# Patient Record
Sex: FEMALE | Race: WHITE | Employment: OTHER | URBAN - METROPOLITAN AREA
[De-identification: names, ages, dates, MRNs, and addresses within clinical notes are randomized per-mention and may not be internally consistent; named-entity substitution may affect disease eponyms.]

---

## 2017-04-24 PROCEDURE — 81001 URINALYSIS AUTO W/SCOPE: CPT | Performed by: INTERNAL MEDICINE

## 2017-04-28 PROBLEM — E89.0 POSTABLATIVE HYPOTHYROIDISM: Status: ACTIVE | Noted: 2017-04-28

## 2017-04-28 PROBLEM — E03.9 HYPOTHYROIDISM, UNSPECIFIED TYPE: Status: RESOLVED | Noted: 2017-04-28 | Resolved: 2017-04-28

## 2017-04-28 PROBLEM — E03.9 HYPOTHYROIDISM, UNSPECIFIED TYPE: Status: ACTIVE | Noted: 2017-04-28

## 2017-11-20 PROCEDURE — 83970 ASSAY OF PARATHORMONE: CPT | Performed by: INTERNAL MEDICINE

## 2018-01-09 PROBLEM — J42 CHRONIC BRONCHITIS, UNSPECIFIED CHRONIC BRONCHITIS TYPE (HCC): Status: ACTIVE | Noted: 2018-01-09

## 2018-11-30 PROBLEM — M81.0 SENILE OSTEOPOROSIS: Status: ACTIVE | Noted: 2018-11-30

## 2019-08-08 PROBLEM — M54.2 NECK PAIN: Status: ACTIVE | Noted: 2019-08-08

## 2019-12-06 PROBLEM — I48.0 PAROXYSMAL ATRIAL FIBRILLATION (HCC): Status: ACTIVE | Noted: 2019-12-06

## 2020-06-02 PROBLEM — I70.0 AORTIC ATHEROSCLEROSIS (HCC): Status: ACTIVE | Noted: 2020-06-02

## 2020-06-02 PROBLEM — J43.2 CENTRILOBULAR EMPHYSEMA (HCC): Status: ACTIVE | Noted: 2020-06-02

## 2020-06-02 PROBLEM — J43.9 PULMONARY EMPHYSEMA (HCC): Status: ACTIVE | Noted: 2020-06-02

## 2020-06-02 PROBLEM — J47.9 BRONCHIECTASIS (HCC): Status: ACTIVE | Noted: 2020-06-02

## 2021-07-17 PROBLEM — S32.301D: Status: ACTIVE | Noted: 2021-07-17

## 2021-08-09 PROBLEM — D68.69 SECONDARY HYPERCOAGULABLE STATE (HCC): Status: ACTIVE | Noted: 2021-08-09

## 2021-08-09 PROBLEM — I28.8 DILATATION OF PULMONIC ARTERY (HCC): Status: ACTIVE | Noted: 2021-08-09

## 2021-12-16 PROBLEM — M54.50 CHRONIC RIGHT-SIDED LOW BACK PAIN WITHOUT SCIATICA: Status: ACTIVE | Noted: 2021-12-16

## 2021-12-16 PROBLEM — G89.29 CHRONIC RIGHT-SIDED LOW BACK PAIN WITHOUT SCIATICA: Status: ACTIVE | Noted: 2021-12-16

## 2021-12-16 PROBLEM — R01.1 SYSTOLIC MURMUR: Status: ACTIVE | Noted: 2021-12-16

## 2021-12-23 RX ORDER — ACETAMINOPHEN 500 MG
1000 TABLET ORAL ONCE
Status: CANCELLED | OUTPATIENT
Start: 2021-12-23 | End: 2021-12-23

## 2021-12-23 RX ORDER — MELATONIN 10 MG
CAPSULE ORAL NIGHTLY
COMMUNITY

## 2022-01-03 ENCOUNTER — LAB ENCOUNTER (OUTPATIENT)
Dept: LAB | Facility: HOSPITAL | Age: 82
End: 2022-01-03
Attending: SURGERY
Payer: MEDICARE

## 2022-01-03 DIAGNOSIS — E21.3 HYPERPARATHYROIDISM (HCC): ICD-10-CM

## 2022-01-03 LAB — SARS-COV-2 RNA RESP QL NAA+PROBE: NOT DETECTED

## 2022-01-04 ENCOUNTER — HOSPITAL ENCOUNTER (OUTPATIENT)
Facility: HOSPITAL | Age: 82
Setting detail: HOSPITAL OUTPATIENT SURGERY
Discharge: HOME | End: 2022-01-04
Attending: SURGERY | Admitting: SURGERY
Payer: MEDICARE

## 2022-01-04 ENCOUNTER — ANESTHESIA EVENT (OUTPATIENT)
Dept: SURGERY | Facility: HOSPITAL | Age: 82
End: 2022-01-04
Payer: MEDICARE

## 2022-01-04 ENCOUNTER — ANESTHESIA (OUTPATIENT)
Dept: SURGERY | Facility: HOSPITAL | Age: 82
End: 2022-01-04
Payer: MEDICARE

## 2022-01-04 VITALS
HEART RATE: 89 BPM | HEIGHT: 66 IN | RESPIRATION RATE: 20 BRPM | TEMPERATURE: 99 F | WEIGHT: 165.81 LBS | DIASTOLIC BLOOD PRESSURE: 68 MMHG | BODY MASS INDEX: 26.65 KG/M2 | SYSTOLIC BLOOD PRESSURE: 135 MMHG | OXYGEN SATURATION: 96 %

## 2022-01-04 DIAGNOSIS — E21.3 HYPERPARATHYROIDISM (HCC): Primary | ICD-10-CM

## 2022-01-04 LAB
PTH-INTACT SERPL-MCNC: 102.2 PG/ML
PTH-INTACT SERPL-MCNC: 108.4 PG/ML
PTH-INTACT SERPL-MCNC: 135.9 PG/ML
PTH-INTACT SERPL-MCNC: 70.9 PG/ML
PTH-INTACT SERPL-MCNC: 73.7 PG/ML
PTH-INTACT SERPL-MCNC: 90 PG/ML
PTH-INTACT SERPL-MCNC: <6.3 PG/ML (ref 18.5–88)

## 2022-01-04 PROCEDURE — 88305 TISSUE EXAM BY PATHOLOGIST: CPT | Performed by: SURGERY

## 2022-01-04 PROCEDURE — 83970 ASSAY OF PARATHORMONE: CPT | Performed by: SURGERY

## 2022-01-04 PROCEDURE — 0GBN0ZZ EXCISION OF RIGHT INFERIOR PARATHYROID GLAND, OPEN APPROACH: ICD-10-PCS | Performed by: SURGERY

## 2022-01-04 PROCEDURE — 0GBL0ZZ EXCISION OF RIGHT SUPERIOR PARATHYROID GLAND, OPEN APPROACH: ICD-10-PCS | Performed by: SURGERY

## 2022-01-04 PROCEDURE — 88331 PATH CONSLTJ SURG 1 BLK 1SPC: CPT | Performed by: SURGERY

## 2022-01-04 PROCEDURE — 0GBP0ZZ EXCISION OF LEFT INFERIOR PARATHYROID GLAND, OPEN APPROACH: ICD-10-PCS | Performed by: SURGERY

## 2022-01-04 RX ORDER — ACETAMINOPHEN 500 MG
1000 TABLET ORAL ONCE
COMMUNITY

## 2022-01-04 RX ORDER — HYDROMORPHONE HYDROCHLORIDE 1 MG/ML
INJECTION, SOLUTION INTRAMUSCULAR; INTRAVENOUS; SUBCUTANEOUS
Status: COMPLETED
Start: 2022-01-04 | End: 2022-01-04

## 2022-01-04 RX ORDER — LIDOCAINE HYDROCHLORIDE 10 MG/ML
INJECTION, SOLUTION EPIDURAL; INFILTRATION; INTRACAUDAL; PERINEURAL AS NEEDED
Status: DISCONTINUED | OUTPATIENT
Start: 2022-01-04 | End: 2022-01-04 | Stop reason: SURG

## 2022-01-04 RX ORDER — SODIUM CHLORIDE, SODIUM LACTATE, POTASSIUM CHLORIDE, CALCIUM CHLORIDE 600; 310; 30; 20 MG/100ML; MG/100ML; MG/100ML; MG/100ML
INJECTION, SOLUTION INTRAVENOUS CONTINUOUS
Status: DISCONTINUED | OUTPATIENT
Start: 2022-01-04 | End: 2022-01-04

## 2022-01-04 RX ORDER — ROCURONIUM BROMIDE 10 MG/ML
INJECTION, SOLUTION INTRAVENOUS AS NEEDED
Status: DISCONTINUED | OUTPATIENT
Start: 2022-01-04 | End: 2022-01-04 | Stop reason: SURG

## 2022-01-04 RX ORDER — HYDROCODONE BITARTRATE AND ACETAMINOPHEN 5; 325 MG/1; MG/1
1 TABLET ORAL EVERY 4 HOURS PRN
Qty: 10 TABLET | Refills: 0 | Status: SHIPPED | OUTPATIENT
Start: 2022-01-04

## 2022-01-04 RX ORDER — NALOXONE HYDROCHLORIDE 0.4 MG/ML
80 INJECTION, SOLUTION INTRAMUSCULAR; INTRAVENOUS; SUBCUTANEOUS AS NEEDED
Status: DISCONTINUED | OUTPATIENT
Start: 2022-01-04 | End: 2022-01-04

## 2022-01-04 RX ORDER — HYDROCODONE BITARTRATE AND ACETAMINOPHEN 5; 325 MG/1; MG/1
2 TABLET ORAL AS NEEDED
Status: COMPLETED | OUTPATIENT
Start: 2022-01-04 | End: 2022-01-04

## 2022-01-04 RX ORDER — HYDROCODONE BITARTRATE AND ACETAMINOPHEN 5; 325 MG/1; MG/1
1 TABLET ORAL AS NEEDED
Status: COMPLETED | OUTPATIENT
Start: 2022-01-04 | End: 2022-01-04

## 2022-01-04 RX ORDER — HYDROMORPHONE HYDROCHLORIDE 1 MG/ML
0.4 INJECTION, SOLUTION INTRAMUSCULAR; INTRAVENOUS; SUBCUTANEOUS EVERY 5 MIN PRN
Status: DISCONTINUED | OUTPATIENT
Start: 2022-01-04 | End: 2022-01-04

## 2022-01-04 RX ORDER — ONDANSETRON 2 MG/ML
INJECTION INTRAMUSCULAR; INTRAVENOUS AS NEEDED
Status: DISCONTINUED | OUTPATIENT
Start: 2022-01-04 | End: 2022-01-04 | Stop reason: SURG

## 2022-01-04 RX ORDER — BUPIVACAINE HYDROCHLORIDE 5 MG/ML
INJECTION, SOLUTION EPIDURAL; INTRACAUDAL AS NEEDED
Status: DISCONTINUED | OUTPATIENT
Start: 2022-01-04 | End: 2022-01-04 | Stop reason: HOSPADM

## 2022-01-04 RX ORDER — DEXAMETHASONE SODIUM PHOSPHATE 4 MG/ML
VIAL (ML) INJECTION AS NEEDED
Status: DISCONTINUED | OUTPATIENT
Start: 2022-01-04 | End: 2022-01-04 | Stop reason: SURG

## 2022-01-04 RX ADMIN — DEXAMETHASONE SODIUM PHOSPHATE 12 MG: 4 MG/ML VIAL (ML) INJECTION at 10:31:00

## 2022-01-04 RX ADMIN — SODIUM CHLORIDE, SODIUM LACTATE, POTASSIUM CHLORIDE, CALCIUM CHLORIDE: 600; 310; 30; 20 INJECTION, SOLUTION INTRAVENOUS at 13:18:00

## 2022-01-04 RX ADMIN — LIDOCAINE HYDROCHLORIDE 100 MG: 10 INJECTION, SOLUTION EPIDURAL; INFILTRATION; INTRACAUDAL; PERINEURAL at 10:26:00

## 2022-01-04 RX ADMIN — ONDANSETRON 4 MG: 2 INJECTION INTRAMUSCULAR; INTRAVENOUS at 10:31:00

## 2022-01-04 RX ADMIN — ROCURONIUM BROMIDE 10 MG: 10 INJECTION, SOLUTION INTRAVENOUS at 10:26:00

## 2022-01-04 NOTE — ANESTHESIA POSTPROCEDURE EVALUATION
[address] Patient Status:  Hospital Outpatient Surgery   Age/Gender 80year old female MRN [id number]   Location [address] [facility] Attending Giovani Lopez MD   Hosp Day # 0 PCP MD Luis Fernando Villavicencio

## 2022-01-04 NOTE — BRIEF OP NOTE
Pre-Operative Diagnosis: Hyperparathyroidism ([facility] [E21.3]     Post-Operative Diagnosis: Hyperparathyroidism ([facility] [E21.3]      Procedure Performed:   PARATHYROIDECTOMY WITH INTRAOPERATIVE ULTRASOUND, INTACT PARATHORMONE ASSAY, NERVE MONITORING    Surgeon(

## 2022-01-04 NOTE — ANESTHESIA PREPROCEDURE EVALUATION
PRE-OP EVALUATION    Patient Name: Sharma Crimes    Admit Diagnosis: Hyperparathyroidism ([facility] [E21.3]    Pre-op Diagnosis: Hyperparathyroidism ([facility] [E21.3]    PARATHYROIDECTOMY WITH INTRAOPERATIVE ULTRASOUND, INTACT PARATHORMONE ASSAY, NERVE MONITORING 2100  ELIQUIS 5 MG Oral Tab, Take 1 tablet (5 mg total) by mouth 2 (two) times a day., Disp: 180 tablet, Rfl: 3, 1/1/2022  Albuterol Sulfate  (90 Base) MCG/ACT Inhalation Aero Soln, Inhale 2 puffs into the lungs every 6 (six) hours as needed for [employer] 12/13/2021    K 4.52 12/13/2021     (H) 12/13/2021    CO2 29.0 12/13/2021    BUN 10.0 12/13/2021    CREATSERUM 0.52 12/13/2021    GLU 91 12/13/2021    CA 11.2 (H) 12/13/2021            Airway      Mallampati: I  Mouth opening: >3 FB  TM distance: 4 -

## 2022-01-05 NOTE — OPERATIVE REPORT
[facility]    PATIENT'S NAME: Danielle Rinne   ATTENDING PHYSICIAN: Vicky Sanchez M.D. OPERATING PHYSICIAN: Vicky Sanchez M.D.    PATIENT ACCOUNT#:   [de-identified]    LOCATION:  PREOPASCC  PRE ASCC 3 EDWP 10  MEDICAL RECORD #:   [id number]       DATE OF BI discussed with the patient undergoing a parathyroidectomy. The risks and benefits of surgery were discussed, and patient agreed for the procedure, signed the informed consent.     FINDINGS:  Left inferior parathyroid gland mildly hypercellular, right infer seen on the ultrasound. It was carefully dissected, and then the blood vessel was ligated and also was sent down to Pathology, and then pre-excision and 10 minutes postexcision of the gland blood sample was sent, and again it did not meet the criteria.   B

## 2022-02-01 PROBLEM — E78.00 HYPERCHOLESTEROLEMIA: Status: ACTIVE | Noted: 2022-02-01

## 2022-02-01 PROBLEM — I35.0 NONRHEUMATIC AORTIC VALVE STENOSIS: Status: ACTIVE | Noted: 2022-02-01

## (undated) DEVICE — SCD SLEEVE KNEE HI BLEND

## (undated) DEVICE — AIRWAY ENDO  TRIVANTAGE 8MM

## (undated) DEVICE — 3M™ STERI-STRIP™ REINFORCED ADHESIVE SKIN CLOSURES, R1547, 1/2 IN X 4 IN (12 MM X 100 MM), 6 STRIPS/ENVELOPE: Brand: 3M™ STERI-STRIP™

## (undated) DEVICE — SUTURE MONOCRYL 4-0 PS-2

## (undated) DEVICE — SUTURE VICRYL 3-0 SH

## (undated) DEVICE — ABSORBABLE HEMOSTAT (OXIDIZED REGENERATED CELLULOSE, U.S.P.): Brand: SURGICEL

## (undated) DEVICE — THYROID: Brand: MEDLINE INDUSTRIES, INC.

## (undated) DEVICE — SUTURE SILK 3-0

## (undated) DEVICE — STERILE SYNTHETIC POLYISOPRENE POWDER-FREE SURGICAL GLOVES WITH HYDROGEL COATING: Brand: PROTEXIS

## (undated) DEVICE — 3M(TM) MICROPORE TAPE DISPENSER 1535-2: Brand: 3M™ MICROPORE™

## (undated) DEVICE — SOL  .9 1000ML BTL